# Patient Record
Sex: MALE | Race: WHITE | URBAN - METROPOLITAN AREA
[De-identification: names, ages, dates, MRNs, and addresses within clinical notes are randomized per-mention and may not be internally consistent; named-entity substitution may affect disease eponyms.]

---

## 2024-03-19 ENCOUNTER — OFFICE VISIT (OUTPATIENT)
Dept: OTOLARYNGOLOGY | Facility: CLINIC | Age: 59
End: 2024-03-19

## 2024-03-19 VITALS — HEIGHT: 69 IN | BODY MASS INDEX: 23.25 KG/M2 | WEIGHT: 157 LBS

## 2024-03-19 DIAGNOSIS — J34.2 NASAL SEPTAL DEVIATION: ICD-10-CM

## 2024-03-19 DIAGNOSIS — H65.01 ACUTE SEROUS OTITIS MEDIA OF RIGHT EAR WITHOUT RUPTURE: Primary | ICD-10-CM

## 2024-03-19 PROCEDURE — 99203 OFFICE O/P NEW LOW 30 MIN: CPT | Performed by: SPECIALIST

## 2024-03-19 RX ORDER — LEVOTHYROXINE SODIUM 175 UG/1
175 TABLET ORAL DAILY
COMMUNITY
Start: 2024-02-26

## 2024-03-19 RX ORDER — PREGABALIN 100 MG/1
CAPSULE ORAL
COMMUNITY
Start: 2024-03-06

## 2024-03-19 RX ORDER — INSULIN GLARGINE 100 [IU]/ML
INJECTION, SOLUTION SUBCUTANEOUS
COMMUNITY
Start: 2024-02-26

## 2024-03-19 RX ORDER — INSULIN ASPART 100 [IU]/ML
INJECTION, SOLUTION INTRAVENOUS; SUBCUTANEOUS
COMMUNITY

## 2024-03-19 RX ORDER — CILOSTAZOL 50 MG/1
TABLET ORAL
COMMUNITY

## 2024-03-19 RX ORDER — ASPIRIN 81 MG/1
81 TABLET ORAL DAILY
COMMUNITY
Start: 2023-07-12

## 2024-03-19 RX ORDER — LISINOPRIL 40 MG/1
40 TABLET ORAL DAILY
COMMUNITY
Start: 2023-12-26

## 2024-03-19 RX ORDER — TIZANIDINE 4 MG/1
1 TABLET ORAL EVERY 6 HOURS PRN
COMMUNITY

## 2024-03-19 RX ORDER — AMLODIPINE BESYLATE 10 MG/1
10 TABLET ORAL DAILY
COMMUNITY
Start: 2023-07-12

## 2024-03-19 RX ORDER — AZITHROMYCIN 250 MG/1
TABLET, FILM COATED ORAL
Qty: 11 TABLET | Refills: 0 | Status: SHIPPED | OUTPATIENT
Start: 2024-03-19

## 2024-03-19 RX ORDER — KETOCONAZOLE 200 MG/1
TABLET ORAL
COMMUNITY
Start: 2023-08-23

## 2024-03-19 RX ORDER — CLOPIDOGREL BISULFATE 75 MG/1
TABLET ORAL
COMMUNITY
Start: 2023-10-20

## 2024-03-19 RX ORDER — ROSUVASTATIN CALCIUM 20 MG/1
20 TABLET, COATED ORAL DAILY
COMMUNITY
Start: 2023-07-12

## 2024-03-20 NOTE — PATIENT INSTRUCTIONS
You were placed on 10 days of Zithromycin for a right serous otitis media.  Follow-up in 3 weeks time, sooner if problems.

## 2024-03-20 NOTE — PROGRESS NOTES
Lazaro Larson is a 59 year old male.   Chief Complaint   Patient presents with    Ear Wax     Ear cleaning     HPI:   Patient with decreased hearing in the right ear.    Current Outpatient Medications   Medication Sig Dispense Refill    amLODIPine 10 MG Oral Tab Take 1 tablet (10 mg total) by mouth daily.      aspirin 81 MG Oral Tab EC Take 1 tablet (81 mg total) by mouth daily.      clopidogrel 75 MG Oral Tab       cilostazol 50 MG Oral Tab Take 2 tablets twice a day by oral route.      insulin aspart (NOVOLOG FLEXPEN) 100 Units/mL Subcutaneous Solution Pen-injector If glucose  1 unit, 100-175 2 units, 175-250 3 units, 251-300 5 units, over 300 6 units with meals      ketoconazole 200 MG Oral Tab TAKE 1 TABLET BY MOUTH IN THE  MORNING AND TAKE 1 TABLET BY  MOUTH IN THE EVENING . REPEAT IN 1 WEEK      LANTUS SOLOSTAR 100 UNIT/ML Subcutaneous Solution Pen-injector INJECT 10 UNITS UNDER THE SKIN EVERY 12 HOURS. SCHEDULE FOLLOW UP FOR FURTHER REFILLS.      levothyroxine 175 MCG Oral Tab Take 1 tablet (175 mcg total) by mouth daily.      lisinopril 40 MG Oral Tab Take 1 tablet (40 mg total) by mouth daily.      pregabalin 100 MG Oral Cap       rosuvastatin 20 MG Oral Tab Take 1 tablet (20 mg total) by mouth daily.      tiZANidine 4 MG Oral Tab Take 1 tablet (4 mg total) by mouth every 6 (six) hours as needed.      azithromycin (ZITHROMAX Z-DARIELA) 250 MG Oral Tab Take 1 by oral route every day for 10 days. 2 tablets today. 11 tablet 0    cyanocobalamin 500 MCG Oral Tab Take 1 tablet (500 mcg total) by mouth every other day. (Patient not taking: Reported on 3/19/2024)        Past Medical History:   Diagnosis Date    Essential hypertension     Hyperlipidemia     Type 1 diabetes mellitus (HCC)       Social History:  Social History     Socioeconomic History    Marital status:    Tobacco Use    Smoking status: Every Day     Packs/day: .5     Types: Cigarettes    Smokeless tobacco: Never   Vaping Use    Vaping Use:  Never used   Substance and Sexual Activity    Alcohol use: Not Currently     Comment: only on holidays    Drug use: Never        REVIEW OF SYSTEMS:   GENERAL HEALTH: feels well otherwise  GENERAL : denies fever, chills, sweats, weight loss, weight gain  SKIN: denies any unusual skin lesions or rashes  RESPIRATORY: denies shortness of breath with exertion  NEURO: denies headaches    EXAM:   Ht 5' 9\" (1.753 m)   Wt 157 lb (71.2 kg)   BMI 23.18 kg/m²   System Details   Skin Inspection - Normal.   Constitutional Overall appearance - Normal.   Head/Face Facial features - Normal. Eyebrows - Normal. Skull - Normal.   Eyes Conjunctiva - Right: Normal, Left: Normal. Pupil - Right: Normal, Left: Normal.    Ears Inspection - Right: Normal, Left: Normal.   Canal - Right: Normal, Left: Normal.   TM - Right: Complete right serous otitis media left: Normal.   Nasal External nose - Normal.   Nasal septum -right septal deviation  Turbinates - Normal.   Oral/Oropharynx Lips - Normal, Tonsils - Normal, Tongue - Normal    Neck Exam Inspection - Normal. Palpation - Normal. Parotid gland - Normal. Thyroid gland - Normal.   Lymph Detail Submental. Submandibular. Anterior cervical. Posterior cervical. Supraclavicular all without enlargement   Nasopharynx Normal by mirror examination   Psychiatric Orientation - Oriented to time, place, person & situation. Appropriate mood and affect.   Neurological Memory - Normal. Cranial nerves - Cranial nerves II through XII grossly intact.     ASSESSMENT AND PLAN:   1. Acute serous otitis media of right ear without rupture  Patient placed on 10 days of Zithromycin.  Follow-up in 3 weeks time, sooner if problems.    2. Nasal septal deviation  To the right.      The patient indicates understanding of these issues and agrees to the plan.      Radha Monzon MD  3/19/2024  10:14 PM

## 2024-04-12 ENCOUNTER — OFFICE VISIT (OUTPATIENT)
Dept: OTOLARYNGOLOGY | Facility: CLINIC | Age: 59
End: 2024-04-12

## 2024-04-12 DIAGNOSIS — H91.91 HEARING LOSS OF RIGHT EAR, UNSPECIFIED HEARING LOSS TYPE: Primary | ICD-10-CM

## 2024-04-12 PROCEDURE — 99213 OFFICE O/P EST LOW 20 MIN: CPT | Performed by: STUDENT IN AN ORGANIZED HEALTH CARE EDUCATION/TRAINING PROGRAM

## 2024-04-12 PROCEDURE — 92504 EAR MICROSCOPY EXAMINATION: CPT | Performed by: STUDENT IN AN ORGANIZED HEALTH CARE EDUCATION/TRAINING PROGRAM

## 2024-04-12 NOTE — PROGRESS NOTES
Lazaro Larson is a 59 year old male.   Chief Complaint   Patient presents with    Follow - Up     F/up right ear infection and difficulty hearing  Pt reports taking an antibiotic but no improvement       ASSESSMENT AND PLAN:   1. Hearing loss of right ear, unspecified hearing loss type  59-year-old presents in follow-up regarding his right ear hearing loss.  States that the hearing loss has been present for about 3 months.  Saw Dr. BAEZ was given azithromycin for possible serous otitis media.  States that it did not help.      Exam had a dull tympanic membrane on the right did not see an obvious effusion although somewhat difficult to see through the eardrum    Discussed that I am unsure if he has an effusion or not today although he still reports muffled hearing that has been going on for 3 months.  Will obtain a formal audiogram and tympanogram to investigate the nerve as well.  He will obtain this when he returns from a trip next week.  He lives in [place] but works in "[employer]" and can come to [place] for an audiogram.      The patient indicates understanding of these issues and agrees to the plan.      EXAM:   There were no vitals taken for this visit.    Pertinent exam findings may also be noted above in assessment and plan     System Details   Skin Inspection - Normal.   Constitutional Overall appearance - Normal.   Head/Face Symmetric, TMJ tenderness not present    Eyes EOMI, PERRL   Right ear:  Canal clear, TM intact, no BHAKTI   Left ear:  Canal clear, TM intact, no BHAKTI   Nose: Septum midline, inferior turbinates not enlarged, nasal valves without collapse    Oral cavity/Oropharynx: No lesions or masses on inspection or palpation, tonsils symmetric    Neck: Soft without LAD, thyroid not enlarged  Voice clear/ no stridor   Other:      Scopes and Procedures:             Current Outpatient Medications   Medication Sig Dispense Refill    amLODIPine 10 MG Oral Tab Take 1 tablet (10 mg total) by mouth daily.       aspirin 81 MG Oral Tab EC Take 1 tablet (81 mg total) by mouth daily.      clopidogrel 75 MG Oral Tab       cilostazol 50 MG Oral Tab Take 2 tablets twice a day by oral route.      cyanocobalamin 500 MCG Oral Tab Take 1 tablet (500 mcg total) by mouth every other day. (Patient not taking: Reported on 3/19/2024)      insulin aspart (NOVOLOG FLEXPEN) 100 Units/mL Subcutaneous Solution Pen-injector If glucose  1 unit, 100-175 2 units, 175-250 3 units, 251-300 5 units, over 300 6 units with meals      ketoconazole 200 MG Oral Tab TAKE 1 TABLET BY MOUTH IN THE  MORNING AND TAKE 1 TABLET BY  MOUTH IN THE EVENING . REPEAT IN 1 WEEK      LANTUS SOLOSTAR 100 UNIT/ML Subcutaneous Solution Pen-injector INJECT 10 UNITS UNDER THE SKIN EVERY 12 HOURS. SCHEDULE FOLLOW UP FOR FURTHER REFILLS.      levothyroxine 175 MCG Oral Tab Take 1 tablet (175 mcg total) by mouth daily.      lisinopril 40 MG Oral Tab Take 1 tablet (40 mg total) by mouth daily.      pregabalin 100 MG Oral Cap       rosuvastatin 20 MG Oral Tab Take 1 tablet (20 mg total) by mouth daily.      tiZANidine 4 MG Oral Tab Take 1 tablet (4 mg total) by mouth every 6 (six) hours as needed.      azithromycin (ZITHROMAX Z-DARIELA) 250 MG Oral Tab Take 1 by oral route every day for 10 days. 2 tablets today. 11 tablet 0      Past Medical History:    Essential hypertension    Hyperlipidemia    Type 1 diabetes mellitus (HCC)      Social History:  Social History     Socioeconomic History    Marital status:    Tobacco Use    Smoking status: Every Day     Current packs/day: 0.50     Types: Cigarettes    Smokeless tobacco: Never   Vaping Use    Vaping status: Never Used   Substance and Sexual Activity    Alcohol use: Not Currently     Comment: only on holidays    Drug use: Never          Phan Tatum MD  4/12/2024  1:53 PM